# Patient Record
Sex: MALE | Race: WHITE | NOT HISPANIC OR LATINO | URBAN - METROPOLITAN AREA
[De-identification: names, ages, dates, MRNs, and addresses within clinical notes are randomized per-mention and may not be internally consistent; named-entity substitution may affect disease eponyms.]

---

## 2017-02-07 ENCOUNTER — OFFICE VISIT (OUTPATIENT)
Dept: URGENT CARE | Facility: URGENT CARE | Age: 37
End: 2017-02-07
Payer: COMMERCIAL

## 2017-02-07 ENCOUNTER — RADIANT APPOINTMENT (OUTPATIENT)
Dept: GENERAL RADIOLOGY | Facility: CLINIC | Age: 37
End: 2017-02-07
Attending: FAMILY MEDICINE
Payer: COMMERCIAL

## 2017-02-07 VITALS
OXYGEN SATURATION: 96 % | DIASTOLIC BLOOD PRESSURE: 90 MMHG | TEMPERATURE: 97 F | SYSTOLIC BLOOD PRESSURE: 116 MMHG | HEART RATE: 74 BPM | RESPIRATION RATE: 16 BRPM | WEIGHT: 217 LBS

## 2017-02-07 DIAGNOSIS — J01.90 ACUTE SINUSITIS WITH SYMPTOMS > 10 DAYS: ICD-10-CM

## 2017-02-07 DIAGNOSIS — M94.0 COSTOCHONDRITIS: ICD-10-CM

## 2017-02-07 DIAGNOSIS — R07.9 ACUTE CHEST PAIN: ICD-10-CM

## 2017-02-07 DIAGNOSIS — R07.9 ACUTE CHEST PAIN: Primary | ICD-10-CM

## 2017-02-07 PROCEDURE — 93000 ELECTROCARDIOGRAM COMPLETE: CPT | Performed by: FAMILY MEDICINE

## 2017-02-07 PROCEDURE — 71020 XR CHEST 2 VW: CPT

## 2017-02-07 PROCEDURE — 99203 OFFICE O/P NEW LOW 30 MIN: CPT | Performed by: FAMILY MEDICINE

## 2017-02-07 RX ORDER — DICLOFENAC SODIUM 75 MG/1
75 TABLET, DELAYED RELEASE ORAL 2 TIMES DAILY PRN
Qty: 14 TABLET | Refills: 0 | Status: SHIPPED | OUTPATIENT
Start: 2017-02-07 | End: 2017-02-14

## 2017-02-07 RX ORDER — AMOXICILLIN 500 MG/1
1000 CAPSULE ORAL 2 TIMES DAILY
Qty: 28 CAPSULE | Refills: 0 | Status: SHIPPED | OUTPATIENT
Start: 2017-02-07 | End: 2017-02-14

## 2017-02-07 NOTE — NURSING NOTE
Chief Complaint   Patient presents with     Urgent Care     Chest Pain     Pain in left side of chest started this morning and has gotten worse. Pt states it is sharp and tight. Pain is an 8/10. Hurts more when inhaling.        Initial /90 mmHg  Pulse 74  Temp(Src) 97  F (36.1  C) (Tympanic)  Resp 16  Wt 217 lb (98.431 kg)  SpO2 96% There is no height on file to calculate BMI.  BP completed using cuff size: YONI Etienne

## 2017-02-07 NOTE — PROGRESS NOTES
Tobin Cali is a 36 year old male who presents to the clinic today complaining of left-sided chest pain.  The nature of the pain is as described:    Location: L sided  Quality: sharp  Quantity: 8/10 in intensity  Chronicity: Onset noticed upon waking this morning and has worsened since  Aggravating factors: raising arms worsens pain  Alleviating factors: nothing  Associated symptoms: has had cold symptoms for about 2.5 weeks, including nasal congestion and now more chest congestion and cough.  Pt states he has had increased stress lately.    Risk factors for coronary artery disease include:none.  Denies family history of CAD in parents.  No history of high cholesterol for this patient.  Smoked a little bit about 20 years ago, none since.     ROS:  Negative except noted above.    No past medical history on file.    Social History   Substance Use Topics     Smoking status: Never Smoker      Smokeless tobacco: Not on file     Alcohol Use: Not on file       No family history on file.    EXAM:    GENERAL: well-appearing, in NAD  HEENT: TMs and canals WNL bilaterally; oral MMM, There is cobblestoning of posterior pharynx.  Chest wall is tender to palpation at the 3rd-5th costochondral junctions on the L. Good excursion bilaterally. Lungs clear to auscultation. Good air movement bilaterally without rales, wheezes, or rhonchi.   Regular rhythm, normal rate. S1 and S2 normal, no murmurs, clicks, gallops or rubs. No edema or JVD.  EXT:  No clubbing, cyanosis or edema.    EKG shows normal sinus rhythm.  There is no ST segment elevation.  Axis is normal.  There are no ectopic beats.  There are inverted T waves in lead III and in two beats on V1 (though those 2 QRS complexes in V1 do not look consistent with the rest of the EKG--suspect leads not attached well).  No comparisons are available.    CXR shows no acute infiltrate or pneumothorax.    Assessment:   Atypical Chest Pain. Reproducible with chest wall palpation suggest  costochondritis, but I also suspect some pleurisy is playing a role here as well.  Low risk for cardiac etiology based on history.  Sinus infection    Plan:   Amoxicillin 1000 mg BID x 7 days for sinus infection.  Diclofenac 75 mg BID PRN for costochondritis/pleurisy.  Discussed with patient that if chest pain worsens or other symptoms worsen, he needs to seek care in ER ASAP.

## 2017-10-24 ENCOUNTER — OFFICE VISIT (OUTPATIENT)
Dept: SURGERY | Facility: CLINIC | Age: 37
End: 2017-10-24
Payer: COMMERCIAL

## 2017-10-24 ENCOUNTER — RECORDS - HEALTHEAST (OUTPATIENT)
Dept: ADMINISTRATIVE | Facility: OTHER | Age: 37
End: 2017-10-24

## 2017-10-24 VITALS
BODY MASS INDEX: 24.96 KG/M2 | DIASTOLIC BLOOD PRESSURE: 88 MMHG | WEIGHT: 205 LBS | HEIGHT: 76 IN | HEART RATE: 67 BPM | SYSTOLIC BLOOD PRESSURE: 150 MMHG

## 2017-10-24 DIAGNOSIS — R10.32 LEFT INGUINAL PAIN: Primary | ICD-10-CM

## 2017-10-24 DIAGNOSIS — N50.819 PAIN IN TESTIS: ICD-10-CM

## 2017-10-24 PROCEDURE — 99204 OFFICE O/P NEW MOD 45 MIN: CPT | Performed by: SURGERY

## 2017-10-24 NOTE — PROGRESS NOTES
"[facility] Surgery Clinic Consultation    CHIEF COMPLAINT:  Chief Complaint   Patient presents with     Consult     recurrent hernia        HISTORY OF PRESENT ILLNESS:  Tobin Cali is a 37 year old male who is seen in consultation for evaluation of concern for inguinal hernia recurrence. He reports he had a laparoscopic bilateral inguinal hernia repair in November 2016. After healing, his groin pain resolved; however for the past 3 months he has had increasing pain in his lower abdomen which radiates to bilateral testicles and both testicles are very painful. He has also had increasing low back pain which he feels is related to the abdominal pain. He has had nausea for the past 2 months on a daily basis as well. There is no bulge in either groin. He is having normal bowel movements. No constipation. No fevers or chills. No emesis. He has increased pain with activity and lifting and feels the symptoms are similar to what he was feeling prior to his hernia surgery last year. .    REVIEW OF SYSTEMS:  10 point review of systems completed and otherwise negative aside from as listed in HPI.     PMH: laparoscopic hernia repair    Past Surgical History:   Procedure Laterality Date     ABDOMEN SURGERY       HERNIA REPAIR         Family History   Problem Relation Age of Onset     Hypertension Father        Social History   Substance Use Topics     Smoking status: Never Smoker     Smokeless tobacco: Not on file     Alcohol use Not on file     He works as a  at [employer]      No Known Allergies    Current Outpatient Prescriptions   Medication Sig Dispense Refill     aspirin-acetaminophen-caffeine (EXCEDRIN MIGRAINE) 250-250-65 MG per tablet Take 1 tablet by mouth every 6 hours as needed for headaches       diclofenac (VOLTAREN) 75 MG EC tablet Take 1 tablet (75 mg) by mouth 2 times daily as needed for moderate pain 14 tablet 0       Vitals: /88  Pulse 67  Ht 6' 4\" (1.93 m)  Wt 205 lb " (93 kg)  BMI 24.95 kg/m2  BMI= Body mass index is 24.95 kg/(m^2).    EXAM:  GENERAL: healthy, alert and no distress   PSYCH: pleasant, normal affect  HEENT: moist mucus membranes, no scleral icterus  CARDIOVASCULAR:  RRR  RESPIRATORY: non labored breathing  NECK: Neck supple. No adenopathy. Thyroid symmetric, normal size,  GI: soft, tender suprapubically, nondistended, no hernias palpable, no hepatosplenomegaly, normal bowel sounds  : mild laxity in right groin on hernia exam but no true hernia present, very tender to palpation of bilateral testicles, left worse than right. No hernia present in left groin.   Extremities: warm and well perfused, no edema  SKIN: No suspicious lesions or rashes    LYMPH: no axillary adenopathy    All labs and imaging personally reviewed and significant for:pending    ASSESSMENT:  Tobin Cali is a 37 year old with a PMH s/f laparoscopic bilateral ingunial hernia repair one year ago who presents with bilateral testicular pain and lower abdominal pain. There is no evidence of hernia recurrence on exam. We discussed obtaining a CT abd/pelvis for further evaluation and he would like to do this. Given his testicular pain, I also ordered a testicular ultrasound for further evaluation.  If there is no hernia recurrence on CT and the ultrasound is negative, I will have him see one of my partners who performs neurectomies as he may have genitofemoral neuralgia.       PLAN:  CT abd/pelvis on 10/25   Testicular ultrasound    It was my pleasure to participate in the care of Tobin Cali in clinic today. Thank you for this consultation.     Total time with patient visit: 40 minutes including discussions about the plan of care and care coordination with the patient.    Lizette Astorga MD    Please route or send letter to:  Primary Care Provider (PCP) and Referring Provider

## 2017-10-24 NOTE — MR AVS SNAPSHOT
"              After Visit Summary   10/24/2017    Tobin Cali    MRN: [id number]           Patient Information     Date Of Birth          1980        Visit Information        Provider Department      10/24/2017 11:30 AM Lizette Astorga MD [facility]      Today's Diagnoses     Left inguinal pain    -  1    Pain in testis          Care Instructions    You are scheduled for CT abdomen/pelvis and Testicular ultrasound at [facility] on 10/25/17 at 9:45am          Follow-ups after your visit        Future tests that were ordered for you today     Open Future Orders        Priority Expected Expires Ordered    CT Abdomen Pelvis w/o & w Contrast Routine 10/24/2017 10/24/2018 10/24/2017    US Testicular & Scrotum w Doppler Ltd Routine 10/24/2017 10/24/2018 10/24/2017            Who to contact     If you have questions or need follow up information about today's clinic visit or your schedule please contact [facility] directly at [phone].  Normal or non-critical lab and imaging results will be communicated to you by Wild[employer] Koreahart, letter or phone within 4 business days after the clinic has received the results. If you do not hear from us within 7 days, please contact the clinic through Luxrt or phone. If you have a critical or abnormal lab result, we will notify you by phone as soon as possible.  Submit refill requests through [email or web address] or call your pharmacy and they will forward the refill request to us. Please allow 3 business days for your refill to be completed.          Additional Information About Your Visit        Wildfire [email or web address] Information     [email or web address] lets you send messages to your doctor, view your test results, renew your prescriptions, schedule appointments and more. To sign up, go to [email or web address] . Click on \"Log in\" on the left side of the screen, which will take you to the Welcome page. Then click on \"Sign up " "Now\" on the right side of the page.     You will be asked to enter the access code listed below, as well as some personal information. Please follow the directions to create your username and password.     Your access code is: [email or web address]  Expires: 2018 12:23 PM     Your access code will  in 90 days. If you need help or a new code, please call your [facility] clinic or [phone].        Care EveryWhere ID     This is your Care EveryWhere ID. This could be used by other organizations to access your [facility] medical records  [id number]        Your Vitals Were     Pulse Height BMI (Body Mass Index)             67 6' 4\" (1.93 m) 24.95 kg/m2          Blood Pressure from Last 3 Encounters:   10/24/17 150/88   17 116/90    Weight from Last 3 Encounters:   10/24/17 205 lb (93 kg)   17 217 lb (98.4 kg)               Primary Care Provider    Physician No Ref-Primary       NO REF-PRIMARY PHYSICIAN        Equal Access to Services     [facility] : Hadii jhonny ku hadasho Soomaali, waaxda luqadaha, qaybta kaalmada adeegyada, etta mari . So [facility] [phone].    ATENCIÓN: Si habla español, tiene a bond disposición servicios gratuitos de asistencia lingüística. Llame al [phone].    We comply with applicable federal civil rights laws and [place] laws. We do not discriminate on the basis of race, color, national origin, age, disability, sex, sexual orientation, or gender identity.            Thank you!     Thank you for choosing [facility]  for your care. Our goal is always to provide you with excellent care. Hearing back from our patients is one way we can continue to improve our services. Please take a few minutes to complete the written survey that you may receive in the mail after your visit with us. Thank you!             Your Updated Medication List - Protect others around you: Learn how to safely use, store and throw away your medicines at " www.disposemymeds.org.          This list is accurate as of: 10/24/17 12:23 PM.  Always use your most recent med list.                   Brand Name Dispense Instructions for use Diagnosis    aspirin-acetaminophen-caffeine 250-250-65 MG per tablet    EXCEDRIN MIGRAINE     Take 1 tablet by mouth every 6 hours as needed for headaches        diclofenac 75 MG EC tablet    VOLTAREN    14 tablet    Take 1 tablet (75 mg) by mouth 2 times daily as needed for moderate pain    Costochondritis

## 2017-10-24 NOTE — PATIENT INSTRUCTIONS
You are scheduled for CT abdomen/pelvis and Testicular ultrasound at [facility] on 10/25/17 at 9:45am

## 2017-10-25 ENCOUNTER — TELEPHONE (OUTPATIENT)
Dept: SURGERY | Facility: CLINIC | Age: 37
End: 2017-10-25

## 2017-10-25 ENCOUNTER — TRANSFERRED RECORDS (OUTPATIENT)
Dept: HEALTH INFORMATION MANAGEMENT | Facility: CLINIC | Age: 37
End: 2017-10-25

## 2017-10-25 ENCOUNTER — RECORDS - HEALTHEAST (OUTPATIENT)
Dept: ADMINISTRATIVE | Facility: OTHER | Age: 37
End: 2017-10-25

## 2017-10-25 NOTE — TELEPHONE ENCOUNTER
I called Tobin with the results of his CT abd/pelvis and his scrotal ultrasound. There was no evidence of recurrent hernia on his CT. The ultrasound was normal. He has a small hydrocele on the left testicle. This would not explain the pain he is having. He could have genitofemoral neuralgia and I would recommend he be seen in the pain clinic for a nerve block to see if this improves the pain. We discussed several scenarios - if the injection works but the pain returns - he may benefit from a surgical neurectomy (I would have him see one of my partners who performs this surgery). If the injection is not successful, he will call our clinic and we will explore other options.     Lizette Astorga MD  [facility]  [phone]

## 2017-10-27 ENCOUNTER — OFFICE VISIT - HEALTHEAST (OUTPATIENT)
Dept: SURGERY | Facility: CLINIC | Age: 37
End: 2017-10-27

## 2017-10-27 DIAGNOSIS — R10.31 BILATERAL GROIN PAIN: ICD-10-CM

## 2017-10-27 DIAGNOSIS — R10.32 BILATERAL GROIN PAIN: ICD-10-CM

## 2017-10-27 ASSESSMENT — MIFFLIN-ST. JEOR: SCORE: 1968.59

## 2017-11-27 ENCOUNTER — COMMUNICATION - HEALTHEAST (OUTPATIENT)
Dept: SURGERY | Facility: CLINIC | Age: 37
End: 2017-11-27

## 2018-01-16 ENCOUNTER — THERAPY VISIT (OUTPATIENT)
Dept: CHIROPRACTIC MEDICINE | Facility: CLINIC | Age: 38
End: 2018-01-16
Payer: COMMERCIAL

## 2018-01-16 DIAGNOSIS — M25.552 BILATERAL HIP PAIN: ICD-10-CM

## 2018-01-16 DIAGNOSIS — R10.30 GROIN PAIN: ICD-10-CM

## 2018-01-16 DIAGNOSIS — M99.06 SOMATIC DYSFUNCTION OF LOWER EXTREMITIES: Primary | ICD-10-CM

## 2018-01-16 DIAGNOSIS — M25.551 BILATERAL HIP PAIN: ICD-10-CM

## 2018-01-16 PROCEDURE — 98943 CHIROPRACT MANJ XTRSPINL 1/>: CPT | Mod: 51 | Performed by: CHIROPRACTOR

## 2018-01-16 PROCEDURE — 99203 OFFICE O/P NEW LOW 30 MIN: CPT | Mod: 25 | Performed by: CHIROPRACTOR

## 2018-01-16 PROCEDURE — 97810 ACUP 1/> WO ESTIM 1ST 15 MIN: CPT | Performed by: CHIROPRACTOR

## 2018-01-16 NOTE — PROGRESS NOTES
Initial Chiropractic Clinic Visit    PCP: No Ref-Primary, Physician    Tobin Cali is a 37 year old male who is seen  as a self referral presenting with chronic bilateral hip, groin, inner thigh pain. Patient reports that the onset was almost 2 years after having hernia surgery. Patient reports he was seen by his doctor and had CT scan done which showed the mesh was in place and working. Patient states the doctor stated he might have nerve pain. He put a referral in for acupuncture and chronic pain clinic. Patient reports he has tried medication for pain.  Prior to onset, the patient was able to sleep without groin pain for 6+ hours. Patient notes that due to symptoms, his sleep is interrupted unless he takes his medication. Tobin Cali notes   sleeping rated at a 4/10 difficulty  and prior to this incident it was 0/10.        Injury: No injury or trauma. Pain started 1.5-2 years after hernia surgery    Location of Pain: both sides groin/hip region at the following level(s) Extra-spinal: hips/groin  Duration of Pain: 8 months  Rating of Pain at worst: 10/10  Rating of Pain Currently: 3-4/10  Symptoms are better with: Walking and movement  Symptoms are worse with: sitting  Additional Features: Denies LE weakness     Health History  as reported by the patient:    How does the patient rate their own health:   Good    Current or past medical history:   No red flags identified, Pain at night/rest, Sleep disorder/apnea and Smoking    Medical allergies  None    Past Traumas/Surgeries  Other:  Hernia repair    Family History  This patient has no significant family history    Medications:  other:  Medication for sleep    Occupation:  School admistrator    Primary job tasks:   Computer work, Prolonged sitting and Prolonged standing    Barriers as home/work:   none    Additional health Issues:     NA          Review of Systems  Musculoskeletal: as above  Remainder of review of systems is negative including constitutional, CV,  "pulmonary, GI, Skin and Neurologic except as noted in HPI or medical history.    No past medical history on file.  Past Surgical History:   Procedure Laterality Date     ABDOMEN SURGERY       HERNIA REPAIR       Objective  There were no vitals taken for this visit.      GENERAL APPEARANCE: healthy, alert and no distress   GAIT: NORMAL  SKIN: no suspicious lesions or rashes  NEURO: Normal strength and tone, mentation intact and speech normal  PSYCH:  mentation appears normal and affect normal/bright    Low back exam:    Inspection:  \"     no visible deformity in the low back       normal skin\",    ROM:       full flexion       full extension    Tender:       Adductor muscles    Non Tender:       remainder of lumbar spine    Strength:       ankle dorsiflexion 5/5 Normal       ankle plantarflexion 5/5 Normal       dorsiflexion of the great toe 5/5 Normal    Reflexes:       patellar (L3, L4) 2 bilaterally       achilles tendons (S1) 2 bilaterally    Sensation:      grossly intact throughout lower extremities    Special tests:  Kemps - Right negative and Left negative, SLR - Right negative and Left negative, Slump - Right negative and Left negative and Fabere - Right negative and Left negative    Segmental spinal dysfunction/restrictions found at::  Extra-spinal right and left hip-restricted.    The following soft tissue hypotonicities were observed:Psoas: left, referred pain: no    Trigger points were found in:Psoas    Muscle spasm found in:Psoas and adductors:       Radiology:  none    Assessment:    No diagnosis found.    RX ordered/plan of care  Anticipated outcomes  Possible risks and side effects    After discussing the risk and benefits of care, patient consented to treatment    Prognosis: Guarded      Patient's condition:  Patient had restrictions pre-manipulation    Treatment effectiveness:  Post manipulation there is better intersegmental movement and Patient claims to feel looser post " manipulation      Plan:    Procedures:  Evaluation and Management:  34342 Moderate level exam 30 min    CMT:  04706 Chiropractic manipulative treatment extraspinal dysfunction/restriction  Extra-spinal: LAD to both right and left hip joints, Supine    Modalities:  28883: Acupuncture, for 15 minutes:  Points: for hernia pain, right and left-psoas/hip pain, adductor pain-patient supine with bolster-15 min    Therapeutic procedures:  None    Response to Treatment  Patient tolerated the treatment well today.      Treatment plan and goals:  Goals:  SLEEPING: the patient specific goal is to attain his pre-injury status of 6 hours comfortably  PAIN: the patient specific goal of thier symptoms is to attain the pre-inury state of 0-1/10 on the VAS    Frequency of care  Duration of care is estimated to be 6 weeks, from the initial treatment.  It is estimated that the patient will need a total of 6 visits to resolve this episode.  For the initial therapeutic trial of care, the frequency is recommended at 1-2 X week, once daily.  A reevaluation would be clinically appropriate in 6 visits, to determine progress and further course of care.    In-Office Treatment  Evaluation  77663 Chiropractic manipulative treatment extraspinal dysfunction/restriction  Extra-spinal: LAD to both right and left hip joints, Supine    Modalities:  52074: Acupuncture, for 15 minutes:  Points: for hernia pain, right and left-psoas/hip pain, adductor pain-patient supine with bolster-15 min      Recommendations:    Instructions:ice 20 minutes every other hour as needed    Follow-up:  Return to care in one week.       Discussed the assessment with the patient.      Disclaimer: This note consists of symbols derived from keyboarding, dictation and/or voice recognition software. As a result, there may be errors in the script that have gone undetected. Please consider this when interpreting information found in this chart.

## 2018-01-18 ENCOUNTER — THERAPY VISIT (OUTPATIENT)
Dept: CHIROPRACTIC MEDICINE | Facility: CLINIC | Age: 38
End: 2018-01-18
Payer: COMMERCIAL

## 2018-01-18 DIAGNOSIS — M25.552 BILATERAL HIP PAIN: Primary | ICD-10-CM

## 2018-01-18 DIAGNOSIS — M25.551 BILATERAL HIP PAIN: Primary | ICD-10-CM

## 2018-01-18 DIAGNOSIS — M99.06 SOMATIC DYSFUNCTION OF LOWER EXTREMITIES: ICD-10-CM

## 2018-01-18 PROCEDURE — 97810 ACUP 1/> WO ESTIM 1ST 15 MIN: CPT | Performed by: CHIROPRACTOR

## 2018-01-18 PROCEDURE — 98943 CHIROPRACT MANJ XTRSPINL 1/>: CPT | Performed by: CHIROPRACTOR

## 2018-01-18 NOTE — PROGRESS NOTES
Visit #:  2 of 6 based on treatment plan 4-6    Subjective:  Tobin Cali is a 37 year old male who is seen in f/u up for: bilateral hip/groin pain     Data Unavailable.     Since last visit on 1/16/2018,  Tobin Cali reports the following changes: Pain immediately after last treatment: 3/10 and their pain level today 3/10. Sleeping rated at a 3/10 difficulty and prior to initial visit 4/10 and prior to this incident it was 0/10. Overall about the same, had a a decent week with manageable pain    Area of chief complaint:  Hip/groin/pelvic:  Symptoms are graded at 3/10. The quality is described as stiff, achey.  Motion has increased, but is still not normal, hip joints. Patient feels that they have not improved very much and feel the same.     Since last visit the patient feels that they are 10 percent  improved from last visit.       Objective:  The following was observed:    P: pain elicited on palpation, inguinal region    A: static palpation demonstrates intersegmental asymmetry -hips    R: motion palpation notes restricted motion    T: muscle spasm at level(s): Psoas Bilaterally      Assessment:    Segmental spinal dysfunction/restrictions found at:  Extra-spinal:    Diagnoses:    No diagnosis found.    Patient's condition:  Patient had restrictions pre-manipulation    Treatment effectiveness:  Post manipulation there is better intersegmental movement and Patient claims to feel looser post manipulation      Procedures:  CMT:  98729 Chiropractic manipulative treatment extraspinal dysfunction/restriction  Extra-spinal: LAD to both right and left hip joints, Supine    Modalities:  83692: Acupuncture, for 15 minutes:  Points: for hernia pain, right and left-psoas/hip pain, adductor pain-patient supine with bolster-15 min    Therapeutic procedures:  None      Prognosis: Guarded    Progress towards Goals: Patient has not made progress towards goal of SLEEPING: the patient specific goal is to attain his pre-injury status  of 6 hours comfortably  PAIN: the patient specific goal of thier symptoms is to attain the pre-inury state of 0-1/10 on the VAS.     Response to Treatment:   Patient tolerated the treatment well today.      Recommendations:    Instructions:heat 15 minutes every other hour as needed    Follow-up:  Return to care in one week.

## 2018-01-23 ENCOUNTER — THERAPY VISIT (OUTPATIENT)
Dept: CHIROPRACTIC MEDICINE | Facility: CLINIC | Age: 38
End: 2018-01-23
Payer: COMMERCIAL

## 2018-01-23 DIAGNOSIS — M25.552 PAIN IN JOINT INVOLVING PELVIC REGION AND THIGH, LEFT: ICD-10-CM

## 2018-01-23 DIAGNOSIS — M25.551 BILATERAL HIP PAIN: Primary | ICD-10-CM

## 2018-01-23 DIAGNOSIS — M99.06 SOMATIC DYSFUNCTION OF LOWER EXTREMITIES: ICD-10-CM

## 2018-01-23 DIAGNOSIS — M25.552 BILATERAL HIP PAIN: Primary | ICD-10-CM

## 2018-01-23 PROCEDURE — 97810 ACUP 1/> WO ESTIM 1ST 15 MIN: CPT | Performed by: CHIROPRACTOR

## 2018-01-23 PROCEDURE — 98943 CHIROPRACT MANJ XTRSPINL 1/>: CPT | Performed by: CHIROPRACTOR

## 2018-01-23 NOTE — PROGRESS NOTES
Visit #:  3 of 6 based on treatment plan 4-6    Subjective:  Tobin Cali is a 37 year old male who is seen in f/u up for: bilateral hip/groin pain     Data Unavailable.     Since last visit on 1/18/18,  Tobin Cali reports the following changes: Pain immediately after last treatment: 3/10 and their pain level today 2/10. Sleeping rated at a 3/10 difficulty and prior to initial visit 4/10 and prior to this incident it was 0/10. Overall about the same, had a a decent week with manageable pain    Area of chief complaint:  Hip/groin/pelvic:  Symptoms are graded at 2/10. The quality is described as stiff, achey.  Motion has increased, but is still not normal, hip joints. Patient feels that they have not improved very much and feel the same.     Since last visit the patient feels that they are 20 percent  improved from last visit.       Objective:  The following was observed:    P: pain elicited on palpation, inguinal region    A: static palpation demonstrates intersegmental asymmetry -hips    R: motion palpation notes restricted motion    T: muscle spasm at level(s): Psoas Bilaterally      Assessment:    Segmental spinal dysfunction/restrictions found at:  Extra-spinal:    Diagnoses:    No diagnosis found.    Patient's condition:  Patient had restrictions pre-manipulation    Treatment effectiveness:  Post manipulation there is better intersegmental movement and Patient claims to feel looser post manipulation      Procedures:  CMT:  99571 Chiropractic manipulative treatment extraspinal dysfunction/restriction  Extra-spinal: LAD to both right and left hip joints, Supine    Modalities:  63303: Acupuncture, for 15 minutes:  Points: for hernia pain, right and left-psoas/hip pain, adductor pain-patient supine with bolster-15 min    Therapeutic procedures:  None      Prognosis: Guarded    Progress towards Goals: Patient has not made progress towards goal of SLEEPING: the patient specific goal is to attain his pre-injury status of  6 hours comfortably  PAIN: the patient specific goal of thier symptoms is to attain the pre-inury state of 0-1/10 on the VAS.     Response to Treatment:   Patient tolerated the treatment well today.      Recommendations:    Instructions:heat 15 minutes every other hour as needed    Follow-up:  Return to care in one week.

## 2018-01-30 ENCOUNTER — THERAPY VISIT (OUTPATIENT)
Dept: CHIROPRACTIC MEDICINE | Facility: CLINIC | Age: 38
End: 2018-01-30
Payer: COMMERCIAL

## 2018-01-30 DIAGNOSIS — M25.552 BILATERAL HIP PAIN: Primary | ICD-10-CM

## 2018-01-30 DIAGNOSIS — M25.551 BILATERAL HIP PAIN: Primary | ICD-10-CM

## 2018-01-30 DIAGNOSIS — M25.552 PAIN IN JOINT INVOLVING PELVIC REGION AND THIGH, LEFT: ICD-10-CM

## 2018-01-30 DIAGNOSIS — M99.06 SOMATIC DYSFUNCTION OF LOWER EXTREMITIES: ICD-10-CM

## 2018-01-30 PROCEDURE — 98943 CHIROPRACT MANJ XTRSPINL 1/>: CPT | Performed by: CHIROPRACTOR

## 2018-01-30 PROCEDURE — 97810 ACUP 1/> WO ESTIM 1ST 15 MIN: CPT | Performed by: CHIROPRACTOR

## 2018-01-30 NOTE — PROGRESS NOTES
Visit #:  4 of 6 based on treatment plan 4-6    Subjective:  Tobin Cali is a 37 year old male who is seen in f/u up for: bilateral hip/groin pain     Data Unavailable.     Since last visit on 1/18/18,  Tobin Cali reports the following changes: Pain immediately after last treatment: 3/10 and their pain level today 5/10. Sleeping rated at a 5/10 difficulty and prior to initial visit 4/10 and prior to this incident it was 0/10. Patient reports having a good week with less pain, until yesterday. States having difficulty sleeping last night.    Area of chief complaint:  Hip/groin/pelvic:  Symptoms are graded at 5/10. The quality is described as stiff, achey.  Motion has increased, but is still not normal, hip joints. Patient feels that they have not improved very much and feel the same.     Since last visit the patient feels that they are 20 percent  worse from last visit.       Objective:  The following was observed:    P: pain elicited on palpation, inguinal region    A: static palpation demonstrates intersegmental asymmetry -hips    R: motion palpation notes restricted motion    T: muscle spasm at level(s): Psoas Bilaterally      Assessment:    Segmental spinal dysfunction/restrictions found at:  Extra-spinal:    Diagnoses:    No diagnosis found.    Patient's condition:  Patient had restrictions pre-manipulation    Treatment effectiveness:  Post manipulation there is better intersegmental movement and Patient claims to feel looser post manipulation      Procedures:  CMT:  35564 Chiropractic manipulative treatment extraspinal dysfunction/restriction  Extra-spinal: LAD to both right and left hip joints, Supine    Modalities:  38454: Acupuncture, for 15 minutes:  Points: for hernia pain, right and left-psoas/hip pain, adductor pain-patient supine with bolster-15 min    Therapeutic procedures:  None      Prognosis: Guarded    Progress towards Goals: Patient has not made progress towards goal of SLEEPING: the patient  specific goal is to attain his pre-injury status of 6 hours comfortably  PAIN: the patient specific goal of thier symptoms is to attain the pre-inury state of 0-1/10 on the VAS.     Response to Treatment:   Patient tolerated the treatment well today.      Recommendations:    Instructions:heat 15 minutes every other hour as needed    Follow-up:  Return to care in one week.

## 2018-02-06 ENCOUNTER — THERAPY VISIT (OUTPATIENT)
Dept: CHIROPRACTIC MEDICINE | Facility: CLINIC | Age: 38
End: 2018-02-06
Payer: COMMERCIAL

## 2018-02-06 DIAGNOSIS — M25.551 BILATERAL HIP PAIN: Primary | ICD-10-CM

## 2018-02-06 DIAGNOSIS — M25.552 BILATERAL HIP PAIN: Primary | ICD-10-CM

## 2018-02-06 DIAGNOSIS — M25.552 PAIN IN JOINT INVOLVING PELVIC REGION AND THIGH, LEFT: ICD-10-CM

## 2018-02-06 DIAGNOSIS — M99.06 SOMATIC DYSFUNCTION OF LOWER EXTREMITIES: ICD-10-CM

## 2018-02-06 PROCEDURE — 97810 ACUP 1/> WO ESTIM 1ST 15 MIN: CPT | Performed by: CHIROPRACTOR

## 2018-02-06 PROCEDURE — 98943 CHIROPRACT MANJ XTRSPINL 1/>: CPT | Performed by: CHIROPRACTOR

## 2018-02-06 NOTE — PROGRESS NOTES
Visit #:  5 of 6 based on treatment plan 4-6    Subjective:  Tobin Cali is a 37 year old male who is seen in f/u up for: bilateral hip/groin pain     Data Unavailable.     Since last visit on 1/18/18,  Tobin Cali reports the following changes: Pain immediately after last treatment: 4-5/10 and their pain level today 4/10. Sleeping rated at a 4/10 difficulty and prior to initial visit 4/10 and prior to this incident it was 0/10. Patient reports having a good week with less pain, until yesterday. States having difficulty sleeping last night.    Area of chief complaint:  Hip/groin/pelvic:  Symptoms are graded at 4/10. The quality is described as stiff, achey.  Motion has increased, but is still not normal, hip joints. Patient feels that they have not improved very much and feel the same.     Since last visit the patient feels that they are 20 percent improved from last visit.       Objective:  The following was observed:    P: pain elicited on palpation, inguinal region    A: static palpation demonstrates intersegmental asymmetry -hips    R: motion palpation notes restricted motion    T: muscle spasm at level(s): Psoas Bilaterally      Assessment:    Segmental spinal dysfunction/restrictions found at:  Extra-spinal:    Diagnoses:    No diagnosis found.    Patient's condition:  Patient had restrictions pre-manipulation    Treatment effectiveness:  Post manipulation there is better intersegmental movement and Patient claims to feel looser post manipulation      Procedures:  CMT:  40349 Chiropractic manipulative treatment extraspinal dysfunction/restriction  Extra-spinal: LAD to both right and left hip joints, Supine    Modalities:  41484: Acupuncture, for 15 minutes:  Points: for hernia pain, right and left-psoas/hip pain, adductor pain-patient supine with bolster-15 min    Therapeutic procedures:  None      Prognosis: Guarded    Progress towards Goals: Patient has not made progress towards goal of SLEEPING: the patient  specific goal is to attain his pre-injury status of 6 hours comfortably  PAIN: the patient specific goal of thier symptoms is to attain the pre-inury state of 0-1/10 on the VAS.     Response to Treatment:   Patient tolerated the treatment well today.      Recommendations:    Instructions:heat 15 minutes every other hour as needed    Follow-up:  Return to care in one week.

## 2018-04-30 ENCOUNTER — OFFICE VISIT - HEALTHEAST (OUTPATIENT)
Dept: SURGERY | Facility: CLINIC | Age: 38
End: 2018-04-30

## 2018-04-30 DIAGNOSIS — R10.32 BILATERAL GROIN PAIN: ICD-10-CM

## 2018-04-30 DIAGNOSIS — R10.31 BILATERAL GROIN PAIN: ICD-10-CM

## 2018-04-30 ASSESSMENT — MIFFLIN-ST. JEOR: SCORE: 1966.89

## 2018-09-18 ENCOUNTER — AMBULATORY - HEALTHEAST (OUTPATIENT)
Dept: SURGERY | Facility: CLINIC | Age: 38
End: 2018-09-18

## 2018-09-18 DIAGNOSIS — R10.31 BILATERAL GROIN PAIN: ICD-10-CM

## 2018-09-18 DIAGNOSIS — R10.32 BILATERAL GROIN PAIN: ICD-10-CM

## 2018-09-18 DIAGNOSIS — Z87.19 S/P LAPAROSCOPIC HERNIA REPAIR: ICD-10-CM

## 2018-09-18 DIAGNOSIS — Z98.890 S/P LAPAROSCOPIC HERNIA REPAIR: ICD-10-CM

## 2018-12-19 ENCOUNTER — AMBULATORY - HEALTHEAST (OUTPATIENT)
Dept: SURGERY | Facility: CLINIC | Age: 38
End: 2018-12-19

## 2018-12-19 DIAGNOSIS — Z87.19 S/P HERNIA REPAIR: ICD-10-CM

## 2018-12-19 DIAGNOSIS — Z98.890 S/P HERNIA REPAIR: ICD-10-CM

## 2018-12-19 DIAGNOSIS — R10.30: ICD-10-CM

## 2018-12-19 DIAGNOSIS — G89.29: ICD-10-CM

## 2021-05-31 VITALS — BODY MASS INDEX: 25.69 KG/M2 | WEIGHT: 211 LBS | HEIGHT: 76 IN

## 2021-06-01 VITALS — WEIGHT: 211.5 LBS | HEIGHT: 76 IN | BODY MASS INDEX: 25.75 KG/M2

## 2021-06-14 NOTE — PROGRESS NOTES
"Consult  HPI:    37 y.o. year old male who I have been consulted by No Primary Care Provider for evaluation of Consult (Bilateral inguinal hernia repair 5/2016, continued pain.)    He had a laparoscopic bilateral inguinal hernia repair. Continues to have pain after procedure. Comes in to get another opinion. Has already visited with another surgeon about this issue. Surgery was done at [facility].     Allergies:Pollen extracts and Ragweed    History reviewed. No pertinent past medical history.    Past Surgical History:   Procedure Laterality Date     INGUINAL HERNIA REPAIR Bilateral 05/2016     VASECTOMY         CURRENT MEDS:  No current outpatient prescriptions on file prior to visit.     No current facility-administered medications on file prior to visit.        Family History   Problem Relation Age of Onset     No Medical Problems Mother      Diverticulitis Father      No Medical Problems Sister      No Medical Problems Sister         reports that he has quit smoking. He has quit using smokeless tobacco. He reports that he drinks alcohol. He reports that he does not use illicit drugs.    Review of Systems:  Negative except bilateral groin pain, Otherwise twelve system of review is negative.    OBJECTIVE:  Vitals:    10/27/17 1236   BP: 139/89   Patient Site: Right Arm   Patient Position: Sitting   Cuff Size: Adult Large   Pulse: 67   SpO2: 96%   Weight: 211 lb (95.7 kg)   Height: 6' 4\" (1.93 m)     Body mass index is 25.68 kg/(m^2).    EXAM:  GENERAL: This is a well-developed 37 y.o. male who appears his stated age  HEAD: normocephalic  HEENT: Pupils equal and reactive bilaterally  MOUTH: mucus membranes intact  CARDIAC: RRR without murmur  CHEST/LUNG:  Clear to auscultation  ABDOMEN: Soft, nontender, nondistended, no masses  Groin: bilateral inguinal areas no hernia repair intact, no masses  EXTREMITIES: Grossly normal, warm,   NEUROLOGIC: Focally intact, nonfocal  INTEGUMENT: No open lesions or " ulcers  VASCULAR: Pulses intact, symmetrical upper and lower extremities.        LABS:  No results found for: WBC, HGB, HCT, MCV, PLT  INR/Prothrombin Time      No results found for: HGBA1C  No results found for: ALT, AST, GGT, ALKPHOS, BILITOT       Assessment/Plan:   1. Bilateral groin pain  Most likely nerve impingement issues or scarring issues  Would first refer to a pain clinic for evaluation or treatment  If nothing works can re operate byt limited success with problems and usually large sensory defects left  - Ambulatory referral to Pain Clinic    No Follow-up on file.     Didier Mckinney MD  [facility] Department of Surgery

## 2021-06-17 NOTE — PROGRESS NOTES
"[facility] Surgery Follow up    HPI:    38 y.o. year old male who returns for a follow up.     Allergies:Pollen extracts and Ragweed    History reviewed. No pertinent past medical history.    Past Surgical History:   Procedure Laterality Date     INGUINAL HERNIA REPAIR Bilateral 05/2016     VASECTOMY         CURRENT MEDS:  No current outpatient prescriptions on file prior to visit.     No current facility-administered medications on file prior to visit.        Family History   Problem Relation Age of Onset     No Medical Problems Mother      Diverticulitis Father      No Medical Problems Sister      No Medical Problems Sister         reports that he has quit smoking. He has quit using smokeless tobacco. He reports that he drinks alcohol. He reports that he does not use illicit drugs.    Review of Systems:  Negative except chronic groin pain after laparoscopic inguinal hernia repair, Otherwise twelve system of review is negative.      OBJECTIVE:  Vitals:    04/30/18 0927   BP: 142/82   Patient Site: Right Arm   Patient Position: Sitting   Cuff Size: Adult Regular   Pulse: 68   SpO2: 98%   Weight: 211 lb 8 oz (95.9 kg)   Height: 6' 3.75\" (1.924 m)     Body mass index is 25.91 kg/(m^2).    EXAM:  GENERAL: This is a well-developed 38 y.o. male who appears his stated age  HEAD: normocephalic  HEENT: Pupils equal and reactive bilaterally  CARDIAC: RRR without murmur  CHEST/LUNG:  Clear to auscultation  ABDOMEN: Soft, nontender, nondistended, no masses    NEUROLOGIC: Focally intact, nonfocal  VASCULAR: Pulses intact, symmetrical upper and lower extremities.        LABS:  No results found for: WBC, HGB, HCT, MCV, PLT  INR/Prothrombin Time      No results found for: HGBA1C  No results found for: ALT, AST, GGT, ALKPHOS, BILITOT         Assessment/Plan:   1. Bilateral groin pain  We have a long discussion today about chronic pain post hernia surgery.  He is gone to the pain clinic and has had evaluation and treatment there " things have not worked out so well and he has not had any resolution.  He is pretty frustrated has sleeping problems at night and continued ongoing issues.  He did have a bilateral laparoscopic hernia repair by . Dr. Newton in years ago and this is really been his issues.  I discussed with him today that I think from an option from my standpoint from surgery try to remove his mesh could be pretty much impossible the comorbidities and risks related doing that would be adding more risky than leaving it alone the option of think hours to take out all the nerves of the 3 main nerves coming out of the inguinal region.  I discussed that this is an option ligating these nerves on each side which may help with some symptoms but may not.  I think he is pretty frustrated with how things are going and I think he should get a second opinion I gave him the name of Dr. Jose Martin Ha who is a general surgeon who specializes in, complex hernia is now on his career is out of [place] I gave him his number.  Other option would be going to mail they have extensive hernia repair surgery kind of clinic or process down there also and I have taking care of referrals from them and for people her complex issues to them also would be another option for him  He is going to look into these options at this time point if not he like to proceed with surgery with me I be happy to do so this would be done open local MAC anesthesia he be related for couple weeks afterwards and unfortunately be most likely left with numbness both in the groin leg and scrotal area.   - Ambulatory referral to General Surgery      No Follow-up on file.     Didier Mckinney MD  [facility] Department of Surgery